# Patient Record
Sex: FEMALE | Race: OTHER | HISPANIC OR LATINO | URBAN - METROPOLITAN AREA
[De-identification: names, ages, dates, MRNs, and addresses within clinical notes are randomized per-mention and may not be internally consistent; named-entity substitution may affect disease eponyms.]

---

## 2022-07-26 ENCOUNTER — EMERGENCY (EMERGENCY)
Facility: HOSPITAL | Age: 67
LOS: 1 days | Discharge: HOME | End: 2022-07-26
Attending: EMERGENCY MEDICINE
Payer: SELF-PAY

## 2022-07-26 VITALS
TEMPERATURE: 99 F | RESPIRATION RATE: 16 BRPM | DIASTOLIC BLOOD PRESSURE: 72 MMHG | SYSTOLIC BLOOD PRESSURE: 115 MMHG | OXYGEN SATURATION: 98 % | HEART RATE: 72 BPM

## 2022-07-26 VITALS
SYSTOLIC BLOOD PRESSURE: 109 MMHG | HEART RATE: 69 BPM | WEIGHT: 160.06 LBS | HEIGHT: 64 IN | TEMPERATURE: 99 F | DIASTOLIC BLOOD PRESSURE: 73 MMHG | RESPIRATION RATE: 20 BRPM | OXYGEN SATURATION: 97 %

## 2022-07-26 LAB
ALBUMIN SERPL ELPH-MCNC: 4.2 G/DL — SIGNIFICANT CHANGE UP (ref 3.3–5)
ALP SERPL-CCNC: 107 U/L — SIGNIFICANT CHANGE UP (ref 40–120)
ALT FLD-CCNC: 14 U/L — SIGNIFICANT CHANGE UP (ref 10–45)
ANION GAP SERPL CALC-SCNC: 12 MMOL/L — SIGNIFICANT CHANGE UP (ref 5–17)
AST SERPL-CCNC: 17 U/L — SIGNIFICANT CHANGE UP (ref 10–40)
BASOPHILS # BLD AUTO: 0.01 K/UL — SIGNIFICANT CHANGE UP (ref 0–0.2)
BASOPHILS NFR BLD AUTO: 0.1 % — SIGNIFICANT CHANGE UP (ref 0–2)
BILIRUB SERPL-MCNC: 0.6 MG/DL — SIGNIFICANT CHANGE UP (ref 0.2–1.2)
BUN SERPL-MCNC: 23 MG/DL — SIGNIFICANT CHANGE UP (ref 7–23)
CALCIUM SERPL-MCNC: 9.6 MG/DL — SIGNIFICANT CHANGE UP (ref 8.4–10.5)
CHLORIDE SERPL-SCNC: 106 MMOL/L — SIGNIFICANT CHANGE UP (ref 96–108)
CO2 SERPL-SCNC: 25 MMOL/L — SIGNIFICANT CHANGE UP (ref 22–31)
CREAT SERPL-MCNC: 0.65 MG/DL — SIGNIFICANT CHANGE UP (ref 0.5–1.3)
EGFR: 96 ML/MIN/1.73M2 — SIGNIFICANT CHANGE UP
EOSINOPHIL # BLD AUTO: 0.14 K/UL — SIGNIFICANT CHANGE UP (ref 0–0.5)
EOSINOPHIL NFR BLD AUTO: 1.3 % — SIGNIFICANT CHANGE UP (ref 0–6)
GLUCOSE SERPL-MCNC: 108 MG/DL — HIGH (ref 70–99)
HCT VFR BLD CALC: 37.6 % — SIGNIFICANT CHANGE UP (ref 34.5–45)
HGB BLD-MCNC: 12.2 G/DL — SIGNIFICANT CHANGE UP (ref 11.5–15.5)
IMM GRANULOCYTES NFR BLD AUTO: 0.3 % — SIGNIFICANT CHANGE UP (ref 0–1.5)
LYMPHOCYTES # BLD AUTO: 1.72 K/UL — SIGNIFICANT CHANGE UP (ref 1–3.3)
LYMPHOCYTES # BLD AUTO: 16 % — SIGNIFICANT CHANGE UP (ref 13–44)
MCHC RBC-ENTMCNC: 28.8 PG — SIGNIFICANT CHANGE UP (ref 27–34)
MCHC RBC-ENTMCNC: 32.4 GM/DL — SIGNIFICANT CHANGE UP (ref 32–36)
MCV RBC AUTO: 88.9 FL — SIGNIFICANT CHANGE UP (ref 80–100)
MONOCYTES # BLD AUTO: 0.95 K/UL — HIGH (ref 0–0.9)
MONOCYTES NFR BLD AUTO: 8.9 % — SIGNIFICANT CHANGE UP (ref 2–14)
NEUTROPHILS # BLD AUTO: 7.88 K/UL — HIGH (ref 1.8–7.4)
NEUTROPHILS NFR BLD AUTO: 73.4 % — SIGNIFICANT CHANGE UP (ref 43–77)
NRBC # BLD: 0 /100 WBCS — SIGNIFICANT CHANGE UP (ref 0–0)
PLATELET # BLD AUTO: 218 K/UL — SIGNIFICANT CHANGE UP (ref 150–400)
POTASSIUM SERPL-MCNC: 4 MMOL/L — SIGNIFICANT CHANGE UP (ref 3.5–5.3)
POTASSIUM SERPL-SCNC: 4 MMOL/L — SIGNIFICANT CHANGE UP (ref 3.5–5.3)
PROT SERPL-MCNC: 7.1 G/DL — SIGNIFICANT CHANGE UP (ref 6–8.3)
RAPID RVP RESULT: SIGNIFICANT CHANGE UP
RBC # BLD: 4.23 M/UL — SIGNIFICANT CHANGE UP (ref 3.8–5.2)
RBC # FLD: 12.8 % — SIGNIFICANT CHANGE UP (ref 10.3–14.5)
SARS-COV-2 RNA SPEC QL NAA+PROBE: SIGNIFICANT CHANGE UP
SODIUM SERPL-SCNC: 143 MMOL/L — SIGNIFICANT CHANGE UP (ref 135–145)
WBC # BLD: 10.73 K/UL — HIGH (ref 3.8–10.5)
WBC # FLD AUTO: 10.73 K/UL — HIGH (ref 3.8–10.5)

## 2022-07-26 PROCEDURE — 36415 COLL VENOUS BLD VENIPUNCTURE: CPT

## 2022-07-26 PROCEDURE — 94640 AIRWAY INHALATION TREATMENT: CPT

## 2022-07-26 PROCEDURE — 0225U NFCT DS DNA&RNA 21 SARSCOV2: CPT

## 2022-07-26 PROCEDURE — 71045 X-RAY EXAM CHEST 1 VIEW: CPT | Mod: 26

## 2022-07-26 PROCEDURE — 99285 EMERGENCY DEPT VISIT HI MDM: CPT

## 2022-07-26 PROCEDURE — 71250 CT THORAX DX C-: CPT | Mod: 26,MA

## 2022-07-26 PROCEDURE — 71045 X-RAY EXAM CHEST 1 VIEW: CPT

## 2022-07-26 PROCEDURE — 80053 COMPREHEN METABOLIC PANEL: CPT

## 2022-07-26 PROCEDURE — 87040 BLOOD CULTURE FOR BACTERIA: CPT

## 2022-07-26 PROCEDURE — 71250 CT THORAX DX C-: CPT | Mod: MA

## 2022-07-26 PROCEDURE — 93005 ELECTROCARDIOGRAM TRACING: CPT

## 2022-07-26 PROCEDURE — 85025 COMPLETE CBC W/AUTO DIFF WBC: CPT

## 2022-07-26 PROCEDURE — 99285 EMERGENCY DEPT VISIT HI MDM: CPT | Mod: 25

## 2022-07-26 RX ORDER — GUAIFENESIN/DEXTROMETHORPHAN 600MG-30MG
10 TABLET, EXTENDED RELEASE 12 HR ORAL ONCE
Refills: 0 | Status: COMPLETED | OUTPATIENT
Start: 2022-07-26 | End: 2022-07-26

## 2022-07-26 RX ORDER — ALBUTEROL 90 UG/1
2 AEROSOL, METERED ORAL ONCE
Refills: 0 | Status: COMPLETED | OUTPATIENT
Start: 2022-07-26 | End: 2022-07-26

## 2022-07-26 RX ORDER — ACETAMINOPHEN 500 MG
975 TABLET ORAL ONCE
Refills: 0 | Status: COMPLETED | OUTPATIENT
Start: 2022-07-26 | End: 2022-07-26

## 2022-07-26 RX ORDER — SODIUM CHLORIDE 9 MG/ML
1000 INJECTION, SOLUTION INTRAVENOUS ONCE
Refills: 0 | Status: COMPLETED | OUTPATIENT
Start: 2022-07-26 | End: 2022-07-26

## 2022-07-26 RX ADMIN — Medication 10 MILLILITER(S): at 12:31

## 2022-07-26 RX ADMIN — ALBUTEROL 2 PUFF(S): 90 AEROSOL, METERED ORAL at 12:31

## 2022-07-26 RX ADMIN — Medication 975 MILLIGRAM(S): at 12:30

## 2022-07-26 RX ADMIN — SODIUM CHLORIDE 1000 MILLILITER(S): 9 INJECTION, SOLUTION INTRAVENOUS at 12:30

## 2022-07-26 NOTE — ED PROVIDER NOTE - CLINICAL SUMMARY MEDICAL DECISION MAKING FREE TEXT BOX
FELY Lebron MD:  67y f PMHx HTN p/w cough. pt reports 15 days of persistent myalgias, HA, cough productive of yellow sputum, chills, odynophagia, rhinorrhea, SOB. tested positive for covid-19 one week ago. Continues to c/o nasal congestion as her main complaint. Will perform CXR, RVP, basic labs, treat sx and reassess. VSS, no hypoxia, hypotension or tachycardia

## 2022-07-26 NOTE — ED PROVIDER NOTE - OBJECTIVE STATEMENT
67y f PMHx HTN p/w cough. pt reports 15 days of persistent myalgias, HA, cough productive of yellow sputum, chills, odynophagia, rhinorrhea, SOB. tested positive for covid-19 one week ago, symptoms have persisted but not worsened. pt has been taking dayquil, nyquil, cepacol and robitussin with minimal relief. vaccinated for covid with [employer] + 1 pfizer booster. nonsmoker. pt did not qualify for paxlovid due to HTN med interaction, was not offered MAB per pt. Pt denies abdominal pain, nausea, vomiting, diarrhea, urinary sx, rash, or dysphagia.    [facility] #[id number]

## 2022-07-26 NOTE — ED PROVIDER NOTE - NSFOLLOWUPINSTRUCTIONS_ED_ALL_ED_FT
Based on your exam and the tests we performed during your visit, it is likely your symptoms are lingering from your recent covid-19 infection    Please follow up with your primary care doctor within 1 week.  *Bring all printed lab/test results to your appointment(s).*    Take acetaminophen 500-1000mg every 6 hrs as needed for pain. DO NOT EXCEED 4000mg DAILY.    Take tessalon perles as prescribed for cough. (Please read all medication information/instructions).    Stay well hydrated with water and electrolyte replacement solutions such as Pedialyte or the adult equivalent.    Return to the emergency department for worsening pain, shortness of breath, chest pain, dizziness, loss of consciousness, multiple episodes of vomiting, or any other concerns.    --------------------------------------------------------------------  Según ku examen y las pruebas que realizamos august ku visita, es probable que jericho síntomas persistan debido a ku reciente infección por covid-19    Akin un seguimiento con ku médico de atención primaria dentro de 1 semana.  *Lleve todos los resultados de laboratorio/pruebas impresos a ku(s) lawrence(s).*    [place] acetaminofén de 500 a 1000 mg cada 6 horas según sea necesario para el dolor. NO EXCEDA LOS 4000 mg DIARIOS.    [place] tessalon perles según lo recetado para la tos. (Por favor wenceslao toda la información/instrucciones del medicamento).    [place]ant[place]ngase jes hidratado con agua y soluciones de reemplazo de electrolitos hellen Pedialyte o el equivalente para adultos.    Regrese a la tonia de emergencias si empeora el dolor, le falta el aire, dolor en el pecho, mareos, pérdida del conocimiento, múltiples episodios de vómitos o cualquier otra inquietud.

## 2022-07-26 NOTE — ED PROVIDER NOTE - PROGRESS NOTE DETAILS
pt reassessed - reports sx improved, stable appearing. VSS. ambulatory o2 sat >95%. will put in for [facility] referral for possible long covid. explained supportive care measures and results in Greenlandic, gave followup instructions and return precautions - Chuy Fulton PA-C

## 2022-07-26 NOTE — ED ADULT NURSE NOTE - OBJECTIVE STATEMENT
66 y/o female presents to ED complaining of covid + 10 days. Pt a&ox4, endorses having symptoms cough, nasal congestion, throat pain, chest pain when coughing x15 days, tested positive 10 days ago. States symptoms are not going away. Has been taking DayQuil, Nyquil, Robitussin with mild relief. Last dose of Robitussin this AM, took Nyquil last night, denies taking DayQuil today. No distress noted at this time. Denies fever, chills, sob, n/v/d. MD at bedside for eval. Orders to follow. 68 y/o female presents to ED complaining of covid + x10 days. Pt a&ox4, endorses having symptoms cough, nasal congestion, throat pain, chest pain when coughing x15 days, tested positive 10 days ago. States symptoms are not going away. Has been taking DayQuil, Nyquil, Robitussin with mild relief. Last dose of Robitussin this AM, took Nyquil last night, denies taking DayQuil today. No distress noted at this time. Denies fever, chills, sob, n/v/d. MD at bedside for eval. Orders to follow.

## 2022-07-26 NOTE — ED PROVIDER NOTE - NS ED ATTENDING STATEMENT MOD
Attending with This was a shared visit with the JERARDO. I reviewed and verified the documentation and independently performed the documented:

## 2022-07-26 NOTE — ED PROVIDER NOTE - PATIENT PORTAL LINK FT
You can access the FollowMyHealth Patient Portal offered by [facility] by registering at the following website: [email or web address]. By joining [facility] [employer] [facility] [employer]’s FollowMyHealth portal, you will also be able to view your health information using other applications (apps) compatible with our system.

## 2022-07-31 LAB
CULTURE RESULTS: SIGNIFICANT CHANGE UP
CULTURE RESULTS: SIGNIFICANT CHANGE UP
SPECIMEN SOURCE: SIGNIFICANT CHANGE UP
SPECIMEN SOURCE: SIGNIFICANT CHANGE UP

## 2023-09-27 NOTE — ED PROVIDER NOTE - CONSTITUTIONAL MENTATION
Received APPROVAL for Trulicity 9.0IK/9.9JJ pen-injectors from 09/12/23 to 09/25/24; approval letter attached. If this requires a response please respond to the pool ( [id number] [phone]). Thank you please advise patient. awake/alert/oriented to person, place, time/situation